# Patient Record
Sex: MALE
[De-identification: names, ages, dates, MRNs, and addresses within clinical notes are randomized per-mention and may not be internally consistent; named-entity substitution may affect disease eponyms.]

---

## 2017-11-08 PROBLEM — Z00.00 ENCOUNTER FOR PREVENTIVE HEALTH EXAMINATION: Status: ACTIVE | Noted: 2017-11-08

## 2019-04-17 ENCOUNTER — RX RENEWAL (OUTPATIENT)
Age: 49
End: 2019-04-17

## 2019-04-19 ENCOUNTER — RX RENEWAL (OUTPATIENT)
Age: 49
End: 2019-04-19

## 2019-06-25 ENCOUNTER — RX RENEWAL (OUTPATIENT)
Age: 49
End: 2019-06-25

## 2019-06-28 ENCOUNTER — RX RENEWAL (OUTPATIENT)
Age: 49
End: 2019-06-28

## 2019-09-25 ENCOUNTER — RX RENEWAL (OUTPATIENT)
Age: 49
End: 2019-09-25

## 2019-09-25 RX ORDER — INSULIN LISPRO 100 [IU]/ML
100 INJECTION, SOLUTION INTRAVENOUS; SUBCUTANEOUS
Qty: 4 | Refills: 0 | Status: ACTIVE | COMMUNITY
Start: 2019-04-17 | End: 1900-01-01

## 2019-09-25 RX ORDER — INSULIN GLARGINE 100 [IU]/ML
100 INJECTION, SOLUTION SUBCUTANEOUS
Qty: 8 | Refills: 0 | Status: ACTIVE | COMMUNITY
Start: 2019-09-25 | End: 1900-01-01

## 2019-11-20 ENCOUNTER — APPOINTMENT (OUTPATIENT)
Dept: ENDOCRINOLOGY | Facility: CLINIC | Age: 49
End: 2019-11-20

## 2019-11-20 DIAGNOSIS — I10 ESSENTIAL (PRIMARY) HYPERTENSION: ICD-10-CM

## 2019-11-20 DIAGNOSIS — E11.9 TYPE 2 DIABETES MELLITUS W/OUT COMPLICATIONS: ICD-10-CM

## 2019-11-20 DIAGNOSIS — E78.5 HYPERLIPIDEMIA, UNSPECIFIED: ICD-10-CM

## 2019-11-20 DIAGNOSIS — Z86.39 PERSONAL HISTORY OF OTHER ENDOCRINE, NUTRITIONAL AND METABOLIC DISEASE: ICD-10-CM

## 2019-11-20 NOTE — HISTORY OF PRESENT ILLNESS
[FreeTextEntry1] : Mr. LYONS is a 49 year year old  male who  returns today in follow up with regard to a history of type 2 diabetes mellitus. The diabetes mellitus hs been present for about  years  There is no known history of retinopathy, nephropathy. He  too denies any history of neuropathy. Current dm medication include   .  HGM of late has shown values to be running  .There has been no significant hypoglycemia.  denies any chest pain, sob, neurologic or ophthalmologic complaints. He  too denies any new podiatric concerns. He  is up to date with his ophthalmologic visit.\par Additional medical history includes that of  hx of lymphoma, hypetension, and hyperlipidemia.\par \par \par

## 2024-06-18 ENCOUNTER — APPOINTMENT (OUTPATIENT)
Dept: ORTHOPEDIC SURGERY | Facility: CLINIC | Age: 54
End: 2024-06-18

## 2025-06-06 ENCOUNTER — APPOINTMENT (OUTPATIENT)
Dept: ORTHOPEDIC SURGERY | Facility: CLINIC | Age: 55
End: 2025-06-06
Payer: COMMERCIAL

## 2025-06-06 PROCEDURE — 72110 X-RAY EXAM L-2 SPINE 4/>VWS: CPT

## 2025-06-06 PROCEDURE — 99204 OFFICE O/P NEW MOD 45 MIN: CPT

## 2025-06-06 RX ORDER — METHYLPREDNISOLONE 4 MG/1
4 TABLET ORAL
Qty: 1 | Refills: 0 | Status: ACTIVE | COMMUNITY
Start: 2025-06-06 | End: 1900-01-01

## 2025-06-06 RX ORDER — METHOCARBAMOL 750 MG/1
750 TABLET, FILM COATED ORAL
Qty: 30 | Refills: 0 | Status: ACTIVE | COMMUNITY
Start: 2025-06-06 | End: 1900-01-01